# Patient Record
Sex: FEMALE | Race: WHITE
[De-identification: names, ages, dates, MRNs, and addresses within clinical notes are randomized per-mention and may not be internally consistent; named-entity substitution may affect disease eponyms.]

---

## 2019-12-15 ENCOUNTER — HOSPITAL ENCOUNTER (EMERGENCY)
Dept: HOSPITAL 15 - ER | Age: 24
Discharge: LEFT BEFORE DISCHARGE | End: 2019-12-15
Payer: MEDICAID

## 2019-12-15 VITALS — HEIGHT: 67 IN | BODY MASS INDEX: 21.19 KG/M2 | WEIGHT: 135 LBS

## 2019-12-15 VITALS — SYSTOLIC BLOOD PRESSURE: 121 MMHG | DIASTOLIC BLOOD PRESSURE: 82 MMHG

## 2019-12-15 DIAGNOSIS — Z53.21: ICD-10-CM

## 2019-12-15 DIAGNOSIS — F11.10: Primary | ICD-10-CM

## 2020-03-29 ENCOUNTER — HOSPITAL ENCOUNTER (INPATIENT)
Dept: HOSPITAL 15 - ER | Age: 25
LOS: 1 days | Discharge: LEFT BEFORE DISCHARGE | DRG: 720 | End: 2020-03-30
Attending: INTERNAL MEDICINE | Admitting: INTERNAL MEDICINE
Payer: MEDICAID

## 2020-03-29 VITALS — SYSTOLIC BLOOD PRESSURE: 99 MMHG | DIASTOLIC BLOOD PRESSURE: 60 MMHG

## 2020-03-29 VITALS — DIASTOLIC BLOOD PRESSURE: 56 MMHG | SYSTOLIC BLOOD PRESSURE: 93 MMHG

## 2020-03-29 VITALS — DIASTOLIC BLOOD PRESSURE: 60 MMHG | SYSTOLIC BLOOD PRESSURE: 99 MMHG

## 2020-03-29 VITALS — BODY MASS INDEX: 22.88 KG/M2 | WEIGHT: 137.35 LBS | HEIGHT: 65 IN

## 2020-03-29 DIAGNOSIS — Z87.891: ICD-10-CM

## 2020-03-29 DIAGNOSIS — Z82.49: ICD-10-CM

## 2020-03-29 DIAGNOSIS — G40.909: ICD-10-CM

## 2020-03-29 DIAGNOSIS — L03.317: ICD-10-CM

## 2020-03-29 DIAGNOSIS — A41.9: Primary | ICD-10-CM

## 2020-03-29 DIAGNOSIS — Z88.8: ICD-10-CM

## 2020-03-29 DIAGNOSIS — F11.90: ICD-10-CM

## 2020-03-29 DIAGNOSIS — Z83.3: ICD-10-CM

## 2020-03-29 DIAGNOSIS — Z88.1: ICD-10-CM

## 2020-03-29 DIAGNOSIS — F15.90: ICD-10-CM

## 2020-03-29 DIAGNOSIS — L02.31: ICD-10-CM

## 2020-03-29 LAB
ALBUMIN SERPL-MCNC: 2.6 G/DL (ref 3.4–5)
ALCOHOL, URINE: < 3 MG/DL (ref 0–5)
ALP SERPL-CCNC: 92 U/L (ref 45–117)
ALT SERPL-CCNC: 49 U/L (ref 13–56)
AMPHETAMINES UR QL SCN: POSITIVE
ANION GAP SERPL CALCULATED.3IONS-SCNC: 9 MMOL/L (ref 5–15)
BARBITURATES UR QL SCN: NEGATIVE
BENZODIAZ UR QL SCN: NEGATIVE
BILIRUB SERPL-MCNC: 0.5 MG/DL (ref 0.2–1)
BUN SERPL-MCNC: 10 MG/DL (ref 7–18)
BUN/CREAT SERPL: 16.7
BZE UR QL SCN: NEGATIVE
CALCIUM SERPL-MCNC: 9 MG/DL (ref 8.5–10.1)
CANNABINOIDS UR QL SCN: NEGATIVE
CHLORIDE SERPL-SCNC: 101 MMOL/L (ref 98–107)
CO2 SERPL-SCNC: 22 MMOL/L (ref 21–32)
GLUCOSE SERPL-MCNC: 97 MG/DL (ref 74–106)
HCT VFR BLD AUTO: 39.3 % (ref 36–46)
HGB BLD-MCNC: 12.9 G/DL (ref 12.2–16.2)
MAGNESIUM SERPL-MCNC: 2.2 MG/DL (ref 1.6–2.6)
MCH RBC QN AUTO: 26.1 PG (ref 28–32)
MCV RBC AUTO: 79.9 FL (ref 80–100)
NRBC BLD QL AUTO: 0 %
OPIATES UR QL SCN: POSITIVE
PCP UR QL SCN: NEGATIVE
POTASSIUM SERPL-SCNC: 4 MMOL/L (ref 3.5–5.1)
PROT SERPL-MCNC: 8.7 G/DL (ref 6.4–8.2)
SODIUM SERPL-SCNC: 132 MMOL/L (ref 136–145)

## 2020-03-29 PROCEDURE — 96375 TX/PRO/DX INJ NEW DRUG ADDON: CPT

## 2020-03-29 PROCEDURE — 81001 URINALYSIS AUTO W/SCOPE: CPT

## 2020-03-29 PROCEDURE — 87040 BLOOD CULTURE FOR BACTERIA: CPT

## 2020-03-29 PROCEDURE — 80053 COMPREHEN METABOLIC PANEL: CPT

## 2020-03-29 PROCEDURE — 72192 CT PELVIS W/O DYE: CPT

## 2020-03-29 PROCEDURE — 83605 ASSAY OF LACTIC ACID: CPT

## 2020-03-29 PROCEDURE — 85025 COMPLETE CBC W/AUTO DIFF WBC: CPT

## 2020-03-29 PROCEDURE — 80307 DRUG TEST PRSMV CHEM ANLYZR: CPT

## 2020-03-29 PROCEDURE — 96366 THER/PROPH/DIAG IV INF ADDON: CPT

## 2020-03-29 PROCEDURE — 83735 ASSAY OF MAGNESIUM: CPT

## 2020-03-29 PROCEDURE — 36415 COLL VENOUS BLD VENIPUNCTURE: CPT

## 2020-03-29 PROCEDURE — 96368 THER/DIAG CONCURRENT INF: CPT

## 2020-03-29 PROCEDURE — 96365 THER/PROPH/DIAG IV INF INIT: CPT

## 2020-03-29 PROCEDURE — 84702 CHORIONIC GONADOTROPIN TEST: CPT

## 2020-03-29 RX ADMIN — MORPHINE SULFATE PRN MG: 4 INJECTION, SOLUTION INTRAMUSCULAR; INTRAVENOUS at 18:07

## 2020-03-29 RX ADMIN — FAMOTIDINE SCH MG: 20 TABLET, FILM COATED ORAL at 21:52

## 2020-03-29 RX ADMIN — CLINDAMYCIN PHOSPHATE SCH MLS/HR: 150 INJECTION, SOLUTION INTRAVENOUS at 21:53

## 2020-03-29 RX ADMIN — MORPHINE SULFATE PRN MG: 4 INJECTION, SOLUTION INTRAMUSCULAR; INTRAVENOUS at 13:42

## 2020-03-29 RX ADMIN — CLINDAMYCIN PHOSPHATE SCH MLS/HR: 150 INJECTION, SOLUTION INTRAVENOUS at 14:28

## 2020-03-29 RX ADMIN — SODIUM CHLORIDE SCH MLS/HR: 0.9 INJECTION, SOLUTION INTRAVENOUS at 22:41

## 2020-03-29 RX ADMIN — MORPHINE SULFATE PRN MG: 4 INJECTION, SOLUTION INTRAMUSCULAR; INTRAVENOUS at 22:11

## 2020-03-29 RX ADMIN — SODIUM CHLORIDE SCH MLS/HR: 0.9 INJECTION, SOLUTION INTRAVENOUS at 13:20

## 2020-03-29 NOTE — NUR
Telemetry admit from ER

KARIE BEAN admitted to Telemetry unit after SBAR received.  Patient oriented to HERIBERTO LIVINGSTON, primary RN, unit, room, bed, and unit policies regarding patient care and visiting 
hours. Patient now on continuous telemetry monitoring, tele box # 70 and telemetry reading 
on arrival to unit is SINUS TACHYCARDIA. Patient placed on bedside oxygen, weighed by 
bedscale and encouraged to call if they need something. All questions and concerns 
addressed, patient verbalized understanding.

## 2020-03-30 VITALS — DIASTOLIC BLOOD PRESSURE: 50 MMHG | SYSTOLIC BLOOD PRESSURE: 97 MMHG

## 2020-03-30 VITALS — SYSTOLIC BLOOD PRESSURE: 105 MMHG | DIASTOLIC BLOOD PRESSURE: 70 MMHG

## 2020-03-30 RX ADMIN — MORPHINE SULFATE PRN MG: 4 INJECTION, SOLUTION INTRAMUSCULAR; INTRAVENOUS at 04:41

## 2020-03-30 RX ADMIN — FAMOTIDINE SCH MG: 20 TABLET, FILM COATED ORAL at 10:00

## 2020-03-30 RX ADMIN — SODIUM CHLORIDE SCH MLS/HR: 0.9 INJECTION, SOLUTION INTRAVENOUS at 06:31

## 2020-03-30 RX ADMIN — CLINDAMYCIN PHOSPHATE SCH MLS/HR: 150 INJECTION, SOLUTION INTRAVENOUS at 05:43

## 2020-03-30 NOTE — NUR
WOUND CARE NOTE: WOUND CARE TEAM IN TO SEE PATIENT PER WOUND CARE REQUEST. BEDSIDE NURSE 
NOTED WOUNDS UPON ADMISSION, PHOTOGRAPHS TAKEN AT THAT TIME FOR REFERENCE. PATIENT ADMITTED 
TO [facility] FOR LEFT GLUTEAL ABSCESS AND LEFT ARM ABSCESS. PATIENT HAS A LARGE CLOSED ABSCESS TO 
THE ENTIRETY OF THE LEFT BUTTOCK, AND A 1.7 x 1.5 OPEN ABSCESS TO THE LEFT UPPER ARM. 
PHOTOGRAPHS TAKEN AT THIS TIME FOR REFERENCE. WOUND TO LEFT UPPER ARM CLEANSED WITH NORMAL 
SALINE, PATTED DRY WITH STERILE GAUZE. APPLIED THERAHONEY AND OPTIFOAM GENTLE DRESSING. 
PATIENT TOLERATED WELL. PATIENT EDUCATED ON NEED TO REMAIN AS AN INPATIENT FOR POSSIBLE 
SURGICAL INTERVENTION. EXPLAINED RISKS OF REFUSING TREATMENT. PATIENT VERBALIZED 
UNDERSTANDING HOWEVER REMAINS BELLIGERENT DURING AND POST DRESSING CHANGE.

## 2020-03-30 NOTE — NUR
AMA Note:



KARIE BEAN states they want to leave the hospital Against Medical Advice (AMA).  Patient 
encouraged to stay for further treatment/stabilization. Shaikh ROXY notified of patient's 
wishes. Patient advised of the risks and benefits of leaving AMA.  Patient verbalized 
understanding. 

IV removed, patient refused to have pressure dressing applied. 

Patient encouraged to return to the ER if symptoms do not improve or worsen.  

-------------------------------------------------------------------------------

Addendum: 03/30/20 at 1234 by DARREN MCDONOUGH RN RN

-------------------------------------------------------------------------------

Patient refused to sign AMA form, witnessed by charge nurse and house supervisor.

## 2020-03-30 NOTE — NUR
Dr. Ferrer back at bedside. 

Explained to patient need for procedure and blood draw at this time.

Patient continues to refuse. 

Will continue to monitor.

## 2020-03-30 NOTE — NUR
Opening Shift Note:

Assumed care of patient. Patient is asleep at this time. No S/S of distress/SOB or pain. 

Bed in lowest locked position, side rails up x 2, call light within reach.

Patient will be instructed on POC and to call for assist PRN, will continue to monitor for 
changes Q1hr and PRN.

## 2020-03-30 NOTE — NUR
Patient refused lab draw.

OR notified.

Per OR procedure cannot be done without lab draw. Patient notified, Patients states "They 
can kiss my ass."

Dr. Ferrer paged. Awaiting call back.

Will continue to monitor.

## 2024-08-24 NOTE — NUR
Patient complained she's hungry. Spoke to hospitalist Abby and updated on patient's 
status. Received order to let patient eat at this time and to put her back to NPO after MN [phone]